# Patient Record
Sex: FEMALE | Race: WHITE | Employment: OTHER | URBAN - METROPOLITAN AREA
[De-identification: names, ages, dates, MRNs, and addresses within clinical notes are randomized per-mention and may not be internally consistent; named-entity substitution may affect disease eponyms.]

---

## 2017-02-07 ENCOUNTER — HOSPITAL ENCOUNTER (EMERGENCY)
Age: 37
Discharge: HOME | End: 2017-02-08
Attending: EMERGENCY MEDICINE
Payer: MEDICARE

## 2017-02-07 DIAGNOSIS — G89.29 CHRONIC MIDLINE LOW BACK PAIN WITHOUT SCIATICA: ICD-10-CM

## 2017-02-07 DIAGNOSIS — R33.9 URINARY RETENTION: Primary | ICD-10-CM

## 2017-02-07 DIAGNOSIS — M54.50 CHRONIC MIDLINE LOW BACK PAIN WITHOUT SCIATICA: ICD-10-CM

## 2017-02-07 LAB
ALBUMIN SERPL BCP-MCNC: 3.4 G/DL (ref 3.5–5)
ALBUMIN/GLOB SERPL: 0.8 {RATIO} (ref 1.2–3.5)
ALP SERPL-CCNC: 65 U/L (ref 50–136)
ALT SERPL-CCNC: 21 U/L (ref 12–65)
ANION GAP BLD CALC-SCNC: 9 MMOL/L (ref 7–16)
AST SERPL W P-5'-P-CCNC: 17 U/L (ref 15–37)
BASOPHILS # BLD AUTO: 0 K/UL (ref 0–0.2)
BASOPHILS # BLD: 0 % (ref 0–2)
BILIRUB SERPL-MCNC: 0.3 MG/DL (ref 0.2–1.1)
BUN SERPL-MCNC: 8 MG/DL (ref 6–23)
CALCIUM SERPL-MCNC: 9.1 MG/DL (ref 8.3–10.4)
CHLORIDE SERPL-SCNC: 107 MMOL/L (ref 98–107)
CO2 SERPL-SCNC: 24 MMOL/L (ref 21–32)
CREAT SERPL-MCNC: 0.66 MG/DL (ref 0.6–1)
DIFFERENTIAL METHOD BLD: ABNORMAL
EOSINOPHIL # BLD: 0.2 K/UL (ref 0–0.8)
EOSINOPHIL NFR BLD: 2 % (ref 0.5–7.8)
ERYTHROCYTE [DISTWIDTH] IN BLOOD BY AUTOMATED COUNT: 12.6 % (ref 11.9–14.6)
GLOBULIN SER CALC-MCNC: 4.3 G/DL (ref 2.3–3.5)
GLUCOSE SERPL-MCNC: 109 MG/DL (ref 65–100)
HCT VFR BLD AUTO: 42.6 % (ref 35.8–46.3)
HGB BLD-MCNC: 14.5 G/DL (ref 11.7–15.4)
IMM GRANULOCYTES # BLD: 0 K/UL (ref 0–0.5)
IMM GRANULOCYTES NFR BLD AUTO: 0.2 % (ref 0–5)
LYMPHOCYTES # BLD AUTO: 18 % (ref 13–44)
LYMPHOCYTES # BLD: 1.5 K/UL (ref 0.5–4.6)
MCH RBC QN AUTO: 30.9 PG (ref 26.1–32.9)
MCHC RBC AUTO-ENTMCNC: 34 G/DL (ref 31.4–35)
MCV RBC AUTO: 90.6 FL (ref 79.6–97.8)
MONOCYTES # BLD: 0.9 K/UL (ref 0.1–1.3)
MONOCYTES NFR BLD AUTO: 10 % (ref 4–12)
NEUTS SEG # BLD: 6.1 K/UL (ref 1.7–8.2)
NEUTS SEG NFR BLD AUTO: 70 % (ref 43–78)
PLATELET # BLD AUTO: 196 K/UL (ref 150–450)
PMV BLD AUTO: 9.2 FL (ref 10.8–14.1)
POTASSIUM SERPL-SCNC: 4.1 MMOL/L (ref 3.5–5.1)
PROT SERPL-MCNC: 7.7 G/DL (ref 6.3–8.2)
RBC # BLD AUTO: 4.7 M/UL (ref 4.05–5.25)
SODIUM SERPL-SCNC: 140 MMOL/L (ref 136–145)
WBC # BLD AUTO: 8.7 K/UL (ref 4.3–11.1)

## 2017-02-07 PROCEDURE — 85025 COMPLETE CBC W/AUTO DIFF WBC: CPT | Performed by: EMERGENCY MEDICINE

## 2017-02-07 PROCEDURE — 74011250636 HC RX REV CODE- 250/636: Performed by: EMERGENCY MEDICINE

## 2017-02-07 PROCEDURE — 80053 COMPREHEN METABOLIC PANEL: CPT | Performed by: EMERGENCY MEDICINE

## 2017-02-07 PROCEDURE — 86140 C-REACTIVE PROTEIN: CPT | Performed by: EMERGENCY MEDICINE

## 2017-02-07 PROCEDURE — 99284 EMERGENCY DEPT VISIT MOD MDM: CPT | Performed by: EMERGENCY MEDICINE

## 2017-02-07 PROCEDURE — 81003 URINALYSIS AUTO W/O SCOPE: CPT | Performed by: EMERGENCY MEDICINE

## 2017-02-07 PROCEDURE — 85652 RBC SED RATE AUTOMATED: CPT | Performed by: EMERGENCY MEDICINE

## 2017-02-07 RX ADMIN — SODIUM CHLORIDE 1000 ML: 900 INJECTION, SOLUTION INTRAVENOUS at 22:52

## 2017-02-08 VITALS
RESPIRATION RATE: 16 BRPM | HEART RATE: 84 BPM | BODY MASS INDEX: 40.32 KG/M2 | HEIGHT: 65 IN | OXYGEN SATURATION: 98 % | TEMPERATURE: 98.3 F | DIASTOLIC BLOOD PRESSURE: 65 MMHG | SYSTOLIC BLOOD PRESSURE: 119 MMHG | WEIGHT: 242 LBS

## 2017-02-08 LAB
CRP SERPL-MCNC: 1.6 MG/DL (ref 0–0.9)
ERYTHROCYTE [SEDIMENTATION RATE] IN BLOOD: 29 MM/HR (ref 0–20)
FLUAV AG NPH QL IA: NEGATIVE
FLUBV AG NPH QL IA: NEGATIVE

## 2017-02-08 PROCEDURE — 87804 INFLUENZA ASSAY W/OPTIC: CPT | Performed by: EMERGENCY MEDICINE

## 2017-02-08 RX ORDER — TRAMADOL HYDROCHLORIDE 50 MG/1
50 TABLET ORAL
Qty: 15 TAB | Refills: 0 | Status: SHIPPED | OUTPATIENT
Start: 2017-02-08 | End: 2018-10-02

## 2017-02-08 RX ORDER — ALBUTEROL SULFATE 0.83 MG/ML
SOLUTION RESPIRATORY (INHALATION)
Status: DISCONTINUED
Start: 2017-02-08 | End: 2017-02-08

## 2017-02-08 NOTE — DISCHARGE INSTRUCTIONS
Back Pain: Care Instructions  Your Care Instructions    Back pain has many possible causes. It is often related to problems with muscles and ligaments of the back. It may also be related to problems with the nerves, discs, or bones of the back. Moving, lifting, standing, sitting, or sleeping in an awkward way can strain the back. Sometimes you don't notice the injury until later. Arthritis is another common cause of back pain. Although it may hurt a lot, back pain usually improves on its own within several weeks. Most people recover in 12 weeks or less. Using good home treatment and being careful not to stress your back can help you feel better sooner. Follow-up care is a key part of your treatment and safety. Be sure to make and go to all appointments, and call your doctor if you are having problems. Its also a good idea to know your test results and keep a list of the medicines you take. How can you care for yourself at home? · Sit or lie in positions that are most comfortable and reduce your pain. Try one of these positions when you lie down:  ¨ Lie on your back with your knees bent and supported by large pillows. ¨ Lie on the floor with your legs on the seat of a sofa or chair. Holly Pacheco on your side with your knees and hips bent and a pillow between your legs. ¨ Lie on your stomach if it does not make pain worse. · Do not sit up in bed, and avoid soft couches and twisted positions. Bed rest can help relieve pain at first, but it delays healing. Avoid bed rest after the first day of back pain. · Change positions every 30 minutes. If you must sit for long periods of time, take breaks from sitting. Get up and walk around, or lie in a comfortable position. · Try using a heating pad on a low or medium setting for 15 to 20 minutes every 2 or 3 hours. Try a warm shower in place of one session with the heating pad. · You can also try an ice pack for 10 to 15 minutes every 2 to 3 hours.  Put a thin cloth between the ice pack and your skin. · Take pain medicines exactly as directed. ¨ If the doctor gave you a prescription medicine for pain, take it as prescribed. ¨ If you are not taking a prescription pain medicine, ask your doctor if you can take an over-the-counter medicine. · Take short walks several times a day. You can start with 5 to 10 minutes, 3 or 4 times a day, and work up to longer walks. Walk on level surfaces and avoid hills and stairs until your back is better. · Return to work and other activities as soon as you can. Continued rest without activity is usually not good for your back. · To prevent future back pain, do exercises to stretch and strengthen your back and stomach. Learn how to use good posture, safe lifting techniques, and proper body mechanics. When should you call for help? Call your doctor now or seek immediate medical care if:  · You have new or worsening numbness in your legs. · You have new or worsening weakness in your legs. (This could make it hard to stand up.)  · You lose control of your bladder or bowels. Watch closely for changes in your health, and be sure to contact your doctor if:  · Your pain gets worse. · You are not getting better after 2 weeks. Where can you learn more? Go to http://agueda-abiel.info/. Enter J883 in the search box to learn more about \"Back Pain: Care Instructions. \"  Current as of: May 23, 2016  Content Version: 11.1  © 2047-4497 [employer]. Care instructions adapted under license by [employer] (which disclaims liability or warranty for this information). If you have questions about a medical condition or this instruction, always ask your healthcare professional. [address] any warranty or liability for your use of this information.

## 2017-02-08 NOTE — ED PROVIDER NOTES
HPI Comments: Patient is a 40-year-old smoker who reports many years ago she had a back injury. She states she believes she slipped disc. This has been ongoing problem for quite some time but has gotten somewhat worse over the past 3 weeks. Over the past several days she noted a low-grade fever at home. Patient also had the sensation that she is unable to fully empty her bladder. She is walking with a walker. She has been doing this for several months because of her back pain. No recent vomiting, shortness of breath, cough. Patient is able to squeeze her cheeks together. She has normal sensation in her lower extremities. The history is provided by the patient. No  was used.         Past Medical History:   Diagnosis Date    Acute sinusitis, unspecified 5/6/2014    ADHD (attention deficit hyperactivity disorder) evaluation 8/26/2016    Allergic rhinitis, cause unspecified 5/6/2014    Arrhythmia      tachycardia per pt from seizure medicine    Bipolar disorder, unspecified ([address] 5/6/2014    Diabetes ([address]     Dysuria 5/6/2014    Hidradenitis 5/6/2014    Hirsutism 5/6/2014    Hypotension, unspecified 5/6/2014    Inflammatory disease of breast 5/6/2014    Memory difficulties 8/26/2016    Obesity, unspecified 5/6/2014    Psychiatric disorder     Seizures ([address]     Spell of altered consciousness 8/26/2016    Tobacco use disorder 5/6/2014    Unspecified asthma(493.90) 5/6/2014    Unspecified gingival and periodontal disease 5/6/2014    Unspecified sleep apnea 5/6/2014    Urinary tract infection, site not specified 5/6/2014       Past Surgical History:   Procedure Laterality Date    Pr abdomen surgery proc unlisted      Hx cholecystectomy      Hx refractive surgery      Hx gyn       Tubal Ligation    Hx orthopaedic       Lumbar Spine Surgery    Hx other surgical       2 eye surgeries as a child for lazy eye         Family History:   Problem Relation Age of Onset    Diabetes Mother     Heart Disease Mother     COPD Father     Depression Father     Diabetes Sister     Heart Disease Sister     Cancer Maternal Grandfather     Heart Disease Maternal Grandfather     Heart Disease Paternal Grandfather        Social History     Social History    Marital status:      Spouse name: N/A    Number of children: N/A    Years of education: N/A     Occupational History    Not on file. Social History Main Topics    Smoking status: Current Every Day Smoker     Packs/day: 0.50     Years: 14.00    Smokeless tobacco: Current User      Comment: E-cig    Alcohol use No    Drug use: No    Sexual activity: Not on file     Other Topics Concern    Not on file     Social History Narrative         ALLERGIES: Sulfa (sulfonamide antibiotics); Sulfamethoxazole-trimethoprim; Codeine; Medrol [methylprednisolone]; and Morphine    Review of Systems    Vitals:    02/07/17 2212   BP: 138/58   Pulse: 95   Resp: 18   Temp: 99.5 °F (37.5 °C)   SpO2: 98%   Weight: 109.8 kg (242 lb)   Height: 5' 5\" (1.651 m)            Physical Exam   Constitutional: She is oriented to person, place, and time. She appears well-developed and well-nourished. No distress. HENT:   Head: Normocephalic and atraumatic. Eyes: Conjunctivae and EOM are normal. Pupils are equal, round, and reactive to light. Neck: Normal range of motion. Neck supple. Cardiovascular: Normal rate, regular rhythm and normal heart sounds. Pulmonary/Chest: Effort normal and breath sounds normal. No respiratory distress. She has no wheezes. She has no rales. Abdominal: Soft. She exhibits no distension. There is no tenderness. There is no rebound. Genitourinary:   Genitourinary Comments: Normal rectal exam with good tone    No CVA tenderness bilaterally   Musculoskeletal: Normal range of motion. She exhibits no edema, tenderness or deformity. Neurological: She is alert and oriented to person, place, and time.    Skin: Skin is warm and dry. No rash noted. She is not diaphoretic. Psychiatric: She has a normal mood and affect. Her behavior is normal.        MDM  Number of Diagnoses or Management Options  Chronic midline low back pain without sciatica: new and does not require workup  Urinary retention: new and does not require workup  Diagnosis management comments: Patient is walking throughout the emergency department without significant difficulty. She urinated without need for a straight catheter. She has had no fevers here in the emergency department. Urine negative. Labs are overall reassuring. Low suspicion for possible epidural abscess or cauda equina syndrome. Discussed follow up with PCP for outpatient MRI if pain persists. Patient expressed understanding and reports that she will call her PCP tomorrow. Discharged home in stable condition. Return precautions discussed.        Amount and/or Complexity of Data Reviewed  Clinical lab tests: ordered and reviewed (Results for orders placed or performed during the hospital encounter of 02/07/17  -INFLUENZA A & B AG (RAPID TEST)       Result                                            Value                         Ref Range                       Influenza A Ag                                    NEGATIVE                      NEG                             Influenza B Ag                                    NEGATIVE                      NEG                        -CBC WITH AUTOMATED DIFF       Result                                            Value                         Ref Range                       WBC                                               8.7                           4.3 - 11.1 K/uL                 RBC                                               4.70                          4.05 - 5.25 M/uL                HGB                                               14.5                          11.7 - 15.4 g/dL                HCT 42.6                          35.8 - 46.3 %                   MCV                                               90.6                          79.6 - 97.8 FL                  MCH                                               30.9                          26.1 - 32.9 PG                  MCHC                                              34.0                          31.4 - 35.0 g/dL                RDW                                               12.6                          11.9 - 14.6 %                   PLATELET                                          196                           150 - 450 K/uL                  MPV                                               9.2 (L)                       10.8 - 14.1 FL                  DF                                                AUTOMATED                                                     NEUTROPHILS                                       70                            43 - 78 %                       LYMPHOCYTES                                       18                            13 - 44 %                       MONOCYTES                                         10                            4.0 - 12.0 %                    EOSINOPHILS                                       2                             0.5 - 7.8 %                     BASOPHILS                                         0                             0.0 - 2.0 %                     IMMATURE GRANULOCYTES                             0.2                           0.0 - 5.0 %                     ABS. NEUTROPHILS                                  6.1                           1.7 - 8.2 K/UL                  ABS. LYMPHOCYTES                                  1.5                           0.5 - 4.6 K/UL                  ABS. MONOCYTES                                    0.9                           0.1 - 1.3 K/UL                  ABS.  EOSINOPHILS                                  0.2                           0.0 - 0.8 K/UL ABS. BASOPHILS                                    0.0                           0.0 - 0.2 K/UL                  ABS. IMM.  GRANS.                                  0.0                           0.0 - 0.5 K/UL             -METABOLIC PANEL, COMPREHENSIVE       Result                                            Value                         Ref Range                       Sodium                                            140                           136 - 145 mmol/L                Potassium                                         4.1                           3.5 - 5.1 mmol/L                Chloride                                          107                           98 - 107 mmol/L                 CO2                                               24                            21 - 32 mmol/L                  Anion gap                                         9                             7 - 16 mmol/L                   Glucose                                           109 (H)                       65 - 100 mg/dL                  BUN                                               8                             6 - 23 MG/DL                    Creatinine                                        0.66                          0.6 - 1.0 MG/DL                 GFR est AA                                        >60                           >60 ml/min/1.73m2               GFR est non-AA                                    >60                           >60 ml/min/1.73m2               Calcium                                           9.1                           8.3 - 10.4 MG/DL                Bilirubin, total                                  0.3                           0.2 - 1.1 MG/DL                 ALT (SGPT)                                        21                            12 - 65 U/L                     AST (SGOT)                                        17                            15 - 37 U/L Alk. phosphatase                                  65                            50 - 136 U/L                    Protein, total                                    7.7                           6.3 - 8.2 g/dL                  Albumin                                           3.4 (L)                       3.5 - 5.0 g/dL                  Globulin                                          4.3 (H)                       2.3 - 3.5 g/dL                  A-G Ratio                                         0.8 (L)                       1.2 - 3.5                  -SED RATE, AUTOMATED       Result                                            Value                         Ref Range                       Sed rate, automated                               29 (H)                        0 - 20 mm/hr               -C REACTIVE PROTEIN, QT       Result                                            Value                         Ref Range                       C-Reactive protein                                1.6 (H)                       0.0 - 0.9 mg/dL            )  Review and summarize past medical records: yes  Independent visualization of images, tracings, or specimens: yes    Risk of Complications, Morbidity, and/or Mortality  Presenting problems: high  Diagnostic procedures: moderate  Management options: moderate    Patient Progress  Patient progress: improved    ED Course       Procedures

## 2017-02-08 NOTE — ED TRIAGE NOTES
Pt arrives with complaints of lower back pain x 3 weeks, difficulty urinating and fever. States took 800mg Advil at [facility].

## 2017-02-24 ENCOUNTER — HOSPITAL ENCOUNTER (EMERGENCY)
Facility: HOSPITAL | Age: 37
Discharge: HOME | End: 2017-02-24
Admitting: EMERGENCY MEDICINE
Payer: COMMERCIAL

## 2017-02-24 VITALS
SYSTOLIC BLOOD PRESSURE: 113 MMHG | HEART RATE: 82 BPM | WEIGHT: 236.8 LBS | OXYGEN SATURATION: 97 % | RESPIRATION RATE: 16 BRPM | DIASTOLIC BLOOD PRESSURE: 56 MMHG | TEMPERATURE: 97.4 F | BODY MASS INDEX: 39.45 KG/M2 | HEIGHT: 65 IN

## 2017-02-24 DIAGNOSIS — R35.89 POLYURIA: Primary | ICD-10-CM

## 2017-02-24 DIAGNOSIS — F32.A DEPRESSION: ICD-10-CM

## 2017-02-24 DIAGNOSIS — R63.1 POLYDIPSIA: ICD-10-CM

## 2017-02-24 LAB
ANION GAP SERPL CALCULATED.3IONS-SCNC: 8 MMOL/L (ref 4–13)
BACTERIA UR QL AUTO: ABNORMAL /HPF
BASOPHILS # BLD AUTO: 0.07 THOUSANDS/ΜL (ref 0–0.1)
BASOPHILS NFR BLD AUTO: 1 % (ref 0–1)
BILIRUB UR QL STRIP: NEGATIVE
BUN SERPL-MCNC: 5 MG/DL (ref 5–25)
CALCIUM SERPL-MCNC: 9.1 MG/DL (ref 8.3–10.1)
CALCIUM SERPL-MCNC: 9.2 MG/DL (ref 8.3–10.1)
CHLORIDE SERPL-SCNC: 105 MMOL/L (ref 100–108)
CLARITY UR: CLEAR
CO2 SERPL-SCNC: 27 MMOL/L (ref 21–32)
COLOR UR: ABNORMAL
CREAT SERPL-MCNC: 0.47 MG/DL (ref 0.6–1.3)
EOSINOPHIL # BLD AUTO: 0.24 THOUSAND/ΜL (ref 0–0.61)
EOSINOPHIL NFR BLD AUTO: 2 % (ref 0–6)
ERYTHROCYTE [DISTWIDTH] IN BLOOD BY AUTOMATED COUNT: 12 % (ref 11.6–15.1)
GFR SERPL CREATININE-BSD FRML MDRD: >60 ML/MIN/1.73SQ M
GLUCOSE SERPL-MCNC: 111 MG/DL (ref 65–140)
GLUCOSE SERPL-MCNC: 94 MG/DL (ref 65–140)
GLUCOSE UR STRIP-MCNC: NEGATIVE MG/DL
HCT VFR BLD AUTO: 44.1 % (ref 34.8–46.1)
HGB BLD-MCNC: 14.8 G/DL (ref 11.5–15.4)
HGB UR QL STRIP.AUTO: ABNORMAL
KETONES UR STRIP-MCNC: NEGATIVE MG/DL
LEUKOCYTE ESTERASE UR QL STRIP: NEGATIVE
LYMPHOCYTES # BLD AUTO: 2.66 THOUSANDS/ΜL (ref 0.6–4.47)
LYMPHOCYTES NFR BLD AUTO: 25 % (ref 14–44)
MCH RBC QN AUTO: 30.4 PG (ref 26.8–34.3)
MCHC RBC AUTO-ENTMCNC: 33.6 G/DL (ref 31.4–37.4)
MCV RBC AUTO: 91 FL (ref 82–98)
MONOCYTES # BLD AUTO: 0.51 THOUSAND/ΜL (ref 0.17–1.22)
MONOCYTES NFR BLD AUTO: 5 % (ref 4–12)
NEUTROPHILS # BLD AUTO: 7.03 THOUSANDS/ΜL (ref 1.85–7.62)
NEUTS SEG NFR BLD AUTO: 67 % (ref 43–75)
NITRITE UR QL STRIP: NEGATIVE
NON-SQ EPI CELLS URNS QL MICRO: ABNORMAL /HPF
NRBC BLD AUTO-RTO: 0 /100 WBCS
OSMOLALITY UR/SERPL-RTO: 290 MMOL/KG (ref 282–298)
OSMOLALITY UR: 108 MMOL/KG
PH UR STRIP.AUTO: 6 [PH] (ref 4.5–8)
PLATELET # BLD AUTO: 214 THOUSANDS/UL (ref 149–390)
PMV BLD AUTO: 9.1 FL (ref 8.9–12.7)
POTASSIUM SERPL-SCNC: 3.9 MMOL/L (ref 3.5–5.3)
PROT UR STRIP-MCNC: NEGATIVE MG/DL
RBC # BLD AUTO: 4.87 MILLION/UL (ref 3.81–5.12)
RBC #/AREA URNS AUTO: ABNORMAL /HPF
SODIUM SERPL-SCNC: 140 MMOL/L (ref 136–145)
SP GR UR STRIP.AUTO: <=1.005 (ref 1–1.03)
UROBILINOGEN UR QL STRIP.AUTO: 0.2 E.U./DL
WBC # BLD AUTO: 10.54 THOUSAND/UL (ref 4.31–10.16)
WBC #/AREA URNS AUTO: ABNORMAL /HPF

## 2017-02-24 PROCEDURE — 82310 ASSAY OF CALCIUM: CPT | Performed by: PHYSICIAN ASSISTANT

## 2017-02-24 PROCEDURE — 83935 ASSAY OF URINE OSMOLALITY: CPT | Performed by: PHYSICIAN ASSISTANT

## 2017-02-24 PROCEDURE — 80048 BASIC METABOLIC PNL TOTAL CA: CPT | Performed by: PHYSICIAN ASSISTANT

## 2017-02-24 PROCEDURE — 84588 ASSAY OF VASOPRESSIN: CPT | Performed by: PHYSICIAN ASSISTANT

## 2017-02-24 PROCEDURE — 82948 REAGENT STRIP/BLOOD GLUCOSE: CPT

## 2017-02-24 PROCEDURE — 83930 ASSAY OF BLOOD OSMOLALITY: CPT | Performed by: PHYSICIAN ASSISTANT

## 2017-02-24 PROCEDURE — 87086 URINE CULTURE/COLONY COUNT: CPT | Performed by: PHYSICIAN ASSISTANT

## 2017-02-24 PROCEDURE — 36415 COLL VENOUS BLD VENIPUNCTURE: CPT | Performed by: PHYSICIAN ASSISTANT

## 2017-02-24 PROCEDURE — 81001 URINALYSIS AUTO W/SCOPE: CPT | Performed by: PHYSICIAN ASSISTANT

## 2017-02-24 PROCEDURE — 85025 COMPLETE CBC W/AUTO DIFF WBC: CPT | Performed by: PHYSICIAN ASSISTANT

## 2017-02-24 PROCEDURE — 99284 EMERGENCY DEPT VISIT MOD MDM: CPT

## 2017-02-24 PROCEDURE — 87147 CULTURE TYPE IMMUNOLOGIC: CPT | Performed by: PHYSICIAN ASSISTANT

## 2017-02-24 RX ORDER — ONDANSETRON 2 MG/ML
4 INJECTION INTRAMUSCULAR; INTRAVENOUS ONCE
Status: DISCONTINUED | OUTPATIENT
Start: 2017-02-24 | End: 2017-02-24 | Stop reason: HOSPADM

## 2017-02-26 LAB
BACTERIA UR CULT: NORMAL
BACTERIA UR CULT: NORMAL

## 2017-03-02 LAB
OSMOLALITY SERPL: 282 MOSMOL/KG (ref 275–295)
VASOPRESSIN SERPL-MCNC: 2 PG/ML (ref 0–4.7)

## 2017-03-06 ENCOUNTER — ALLSCRIPTS OFFICE VISIT (OUTPATIENT)
Dept: OTHER | Facility: OTHER | Age: 37
End: 2017-03-06

## 2017-03-06 DIAGNOSIS — R35.89 OTHER POLYURIA: ICD-10-CM

## 2017-03-06 DIAGNOSIS — R53.83 OTHER FATIGUE: ICD-10-CM

## 2017-03-06 DIAGNOSIS — Z00.00 ENCOUNTER FOR GENERAL ADULT MEDICAL EXAMINATION WITHOUT ABNORMAL FINDINGS: ICD-10-CM

## 2017-03-06 DIAGNOSIS — E11.9 TYPE 2 DIABETES MELLITUS WITHOUT COMPLICATIONS (HCC): ICD-10-CM

## 2017-03-29 ENCOUNTER — GENERIC CONVERSION - ENCOUNTER (OUTPATIENT)
Dept: OTHER | Facility: OTHER | Age: 37
End: 2017-03-29

## 2017-04-24 ENCOUNTER — ALLSCRIPTS OFFICE VISIT (OUTPATIENT)
Dept: OTHER | Facility: OTHER | Age: 37
End: 2017-04-24

## 2017-04-25 PROBLEM — R40.1: Status: ACTIVE | Noted: 2017-04-25

## 2018-01-10 NOTE — MISCELLANEOUS
Provider Comments  Provider Comments:   PATIENT NO SHOWED FOR 4- APPT  Signatures   Electronically signed by : Jessica Gottlieb MA;  Apr 24 2017 12:16PM EST                       (Author)

## 2018-01-10 NOTE — MISCELLANEOUS
Provider Comments  Provider Comments:   Patient did not show for the appointment      Signatures   Electronically signed by : Han Mason MD; Mar 29 2017  1:19PM EST                       (Author)

## 2018-01-14 VITALS
WEIGHT: 236.06 LBS | HEART RATE: 72 BPM | DIASTOLIC BLOOD PRESSURE: 68 MMHG | SYSTOLIC BLOOD PRESSURE: 108 MMHG | BODY MASS INDEX: 39.33 KG/M2 | HEIGHT: 65 IN

## 2018-10-02 PROBLEM — E66.01 OBESITY, MORBID (HCC): Status: ACTIVE | Noted: 2018-10-02

## 2018-10-02 PROBLEM — R73.01 IMPAIRED FASTING GLUCOSE: Status: ACTIVE | Noted: 2018-10-02

## 2018-10-02 PROBLEM — K21.9 GASTROESOPHAGEAL REFLUX DISEASE: Status: ACTIVE | Noted: 2018-10-02

## 2019-02-06 PROBLEM — E11.9 CONTROLLED TYPE 2 DIABETES MELLITUS WITHOUT COMPLICATION, WITHOUT LONG-TERM CURRENT USE OF INSULIN (HCC): Status: ACTIVE | Noted: 2019-02-06

## 2019-10-15 PROBLEM — L02.211 ABSCESS OF FLANK: Status: ACTIVE | Noted: 2019-10-15

## 2020-12-02 ENCOUNTER — HOSPITAL ENCOUNTER (OUTPATIENT)
Dept: PHYSICAL THERAPY | Age: 40
Discharge: HOME | End: 2020-12-02
Attending: FAMILY MEDICINE
Payer: MEDICARE

## 2020-12-02 DIAGNOSIS — R53.1 RIGHT SIDED WEAKNESS: ICD-10-CM

## 2020-12-02 DIAGNOSIS — I63.9 CEREBROVASCULAR ACCIDENT (CVA), UNSPECIFIED MECHANISM (HCC): ICD-10-CM

## 2020-12-02 PROCEDURE — 97163 PT EVAL HIGH COMPLEX 45 MIN: CPT

## 2020-12-02 NOTE — THERAPY EVALUATION
Starr Dwyer  : 1980  Primary: Jennifer Chandler Medicare Hmo  Secondary:  [address]  [facility] [address] [facility] [place] [facility]\"", [place] [address] [address] [facility]  [phone]   [phone]       OUTPATIENT PHYSICAL THERAPY:Initial Assessment and Discontinuation Summary 2020   ICD-10: Treatment Diagnosis: Difficulty in walking, not elsewhere classified (R26.2)  Precautions/Allergies:   Sulfa (sulfonamide antibiotics); Sulfamethoxazole-trimethoprim; Codeine; Medrol [methylprednisolone]; and Morphine   MD Orders: PT referral  MEDICAL/REFERRING DIAGNOSIS:  Cerebrovascular accident (CVA), unspecified mechanism ([facility] [address] [I63.9]  Right sided weakness [R53.1] DATE OF ONSET: 2020  REFERRING PHYSICIAN: Smita Esparza MD  RETURN PHYSICIAN APPOINTMENT: Unknown  DATE OF PROGRESS NOTE:  7989  RECERTIFICATION DATE:    Starr Dwyer has been seen in physical therapy from 2020 to 2020 for 1 visits. Treatment has been discontinued at this time due to patient failing to return for additional treatment. Thank you for this referral.       INITIAL ASSESSMENT:  Ms. Tiffanie Hawkins presents today in our clinic wheelchair with her sister. Pt states she can answer for herself so her sister was not invited back. Pt had  great difficulty providing facts of her current condition, prior history and medications. Her time line if off or fabricated. Pt presents here after visit to the ER for stroke like symptoms on 2020 for which she then left AMA ambulatory on her own. Hector Cool RN - 2020 2:53 PM EST  Patient left AMA at this time. This RN explained to patients the benefits of staying in hospital and receiving care. Patient didn't think it was necessary to stay in hospital. This RN explained the risk of leaving to the patient, including the risk of permanent disability and/or death. Patient signed AMA paperwork and was ambulatory to ER lobby.    Electronically signed by Jailyn Perez RN at 11/11/2020 2:55 PM ES. As a result pt has had no follow up of any kind of therapy or equipment. Pt states she has been falling and does not want to be a burden to anyone. Pt presents with conflicting facts and dates as well as fluctuating presentation on the right that is not consistent with a hemiparesis or stroke paralysis. Pt is requesting a w/c or a walker. Pt can barely stand even through her strong side. Pt stood fully flexed and collapsed to the right while gripping to the barre. Pt though was able to walk down her ramp to get to her sisters car to get here. Pt cannot raise her right foot on command but has no foot drop or drag during gait. Pt cannot perform hip flexion on request on the right but can independently lift her leg onto the bed for bed mobility. Pt sits with right elbow flexed to 90* and her hand flexed and clawed in sitting and moves only slowly on command but was able to fully put her hand down to the mat with transfer and have her right hand try to assist the right leg with hip flexion. .  Pt states she can only touch her nose with that right hand but later was able to brush her hair easily away  from her forehead. Pt states later that she does not use the arm because it hurts and burns. Pt \"falls\" onto the mat and into the car with transfers but no full collapse of her legs. Pt presents with signs and symptoms of Functional neurologic - conversion disorder. Currently pt is not able to utilize her right side functionally and is not able to care for her self at this time and will benefit from physical therapy to provide training and support to regain full use of her right side. PROBLEM LIST (Impacting functional limitations):  1. Decreased Strength  2. Decreased ADL/Functional Activities  3. Decreased Transfer Abilities  4. Decreased Ambulation Ability/Technique  5. Decreased Balance  6. Increased Pain  7.  Decreased [place] with Home Exercise Program  8. Decreased Cognition INTERVENTIONS PLANNED: (Treatment may consist of any combination of the following)  9. Balance Exercise  10. Family Education  11. Gait Training  12. Home Exercise Program (HEP)  13. Manual Therapy  14. Neuromuscular Re-education/Strengthening  15. Range of Motion (ROM)  16. Therapeutic Activites  17. Therapeutic Exercise/Strengthening  18. Transfer Training   TREATMENT PLAN:  Effective Dates: 12/2/2020 TO 3/3/2021 (90 days). Frequency/Duration: 2 times a week for 90 Days   GOALS: (Goals have been discussed and agreed upon with patient.)  Discharge Goals: Time Frame: 16 visits  1. Pt will show increased strength, range of motion and improved posture to allow for improved safety and ability with all functional mobility. 2. Pt will decrease TUG score by TBA seconds indicating more normalized gait pattern and decrease risk for falls. 3. Pt will increase Castro Balance Scale to 50/56 indicating increased balance and decreased risk for falls. OUTCOME MEASURE:   Tool Used: Castro Balance Scale  Score:  Initial: 10/56 Most Recent: X/56 (Date: -- )   Interpretation of Score: Each section is scored on a 0-4 scale, 0 representing the patients inability to perform the task and 4 representing independence. The scores of each section are added together for a total score of 56. The higher the patients score, the more independent the patient is. Any score below 45 indicates increased risk for falls. Tool Used: Timed Up and Go (TUG)  Score:  Initial: TBA seconds Most Recent: X seconds (Date: -- )   Interpretation of Score: The test measures, in seconds, the time taken by an individual to stand up from a standard arm chair (seat height 46 cm [18 in], arm height 65 cm [25.6 in]), walk a distance of 3 meters (118 in, approx 10 ft), turn, walk back to the chair and sit down.   If the individual takes longer than 14 seconds to complete TUG, this indicates risk for falls.    MEDICAL NECESSITY:   · Skilled intervention continues to be required due to inability to use right side functionally. REASON FOR SERVICES/OTHER COMMENTS:  · Patient continues to require skilled intervention due to difficulty with all mobility. .  Total Duration:  PT Patient Time In/Time Out  Time In: 1100  Time Out: 1200    Rehabilitation Potential For Stated Goals: [address] [facility] therapy, I certify that the treatment plan above will be carried out by a therapist or under their direction. Thank you for this referral,  Milena John PT     Referring Physician Signature: Amanda Kennedy MD _______________________________ Date _____________     PAIN/SUBJECTIVE:   Initial: 5/10 Post Session:  5/10   HISTORY:   Patient's Goal: To be able to walk again hopefully by myself. Use my hands as much as possile and be back as much as possible. History of Injury/Illness (Reason for Referral):  36year old left handed female s/p CVA on 11/11/2020 with right sided weakness. CT scan negative. Left ER AMA walking. \"We have a ramp to get in the house because my mom is in a wheel chair. So that is how I pull myself to the car because I can use the rail and quite honestly I crawl some of the time and then she helps me up. Its after I have fallen. Some days I fall 3 - 4 times a day. Otherwise I just stay in bed all day that way I don't fall. Its a strain for someone to always be having to pick someone my size up from the floor\"  Pt sits with right elbow flexed to 90* and her hand flexed and clawed in sitting and moves only slowly on command but was able to fully put her hand down to the mat with transfer and have her right hand try to assist the right leg with hip flexion. Pt states she can only touch her nose with that right hand but later was able to brush her hair easily away  from her forehead. Pt states later that she does not use the arm because it hurts and burns.   Pt \"falls\" onto the mat and into the car with transfers but no full collapse of her legs. Pt has orange stain on her left middle finger - pt states she is down to 1/2 pack a day stretching them out to one every 4 - 5 hours. My arm and my legs on that side burn and feel like it has pressure on it and its hurts it. 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 This time frame is not consistent with her medical chart. She states that physicians in [facility] gave her anxiety meds and that is why they are not showing up in this record. Past Medical History/Comorbidities:   Ms. Haseeb Lott  has a past medical history of Acute sinusitis, unspecified (5/6/2014), ADHD (attention deficit hyperactivity disorder) evaluation (8/26/2016), Allergic rhinitis, cause unspecified (5/6/2014), Arrhythmia, Asthma, Bipolar disorder, unspecified ([address] (5/6/2014), Diabetes ([address] Dysuria (5/6/2014), GERD (gastroesophageal reflux disease), Hidradenitis (5/6/2014), Hirsutism (5/6/2014), Hypotension, unspecified (5/6/2014), Inflammatory disease of breast (5/6/2014), Memory difficulties (8/26/2016), Obesity, unspecified (5/6/2014), Psychiatric disorder, PTSD (post-traumatic stress disorder), PUD (peptic ulcer disease), Seizures ([facility] [address] Spell of altered consciousness (8/26/2016), Stroke [facility]), Tobacco use disorder (5/6/2014), Unspecified asthma(493.90) (5/6/2014), Unspecified gingival and periodontal disease (5/6/2014), Unspecified sleep apnea (5/6/2014), and Urinary tract infection, site not specified (5/6/2014). Ms. Haseeb Lott  has a past surgical history that includes pr abdomen surgery proc unlisted; hx cholecystectomy; hx refractive surgery; hx gyn; hx orthopaedic; and hx other surgical.     Social History/Living Environment:     Lives with sister and mom.   \"We have a ramp to get in the house because my mom is in a wheel chair. So that is how I pull myself to the car because I can use the rail and quite honestly I crawl some of the time and then she helps me up. Its after I have fallen. Some days I fall 3 - 4 times a day. Otherwise I just stay in bed all day that way I don't fall. Its a strain for someone to always be having to pick someone my size up from the floor\". 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 Prior Level of Function/Work/Activity:  Independent prior to stroke. Cant bathe myself or anything. Tub shower combo - has not gotten into the shower since 11/11/2020  Sink bathing with bowl of hot water. Ambulatory/Rehab Services H2 Model Falls Risk Assessment   Risk Factors:       (4)  Confusion/Disorientation/Impulsivity       (5)  History of Recent Falls [w/in 3 months] Ability to Rise from Chair:       (3)  Multiple attempts, but successful   Falls Prevention Plan:       Physical Limitations to Exercise (specify):  barely walking. stays in bed   Total: (5 or greater = High Risk): 12   ©2010 [facility] . [facility] States Patent #[id number]. Federal Law prohibits the replication, distribution or use without written permission from [facility]   Current Medications:     Current Outpatient Medications:     acetaminophen (TylenoL) 325 mg tablet, Take  by mouth every four (4) hours as needed for Pain., Disp: , Rfl:     aspirin 81 mg chewable tablet, Take 81 mg by mouth daily. , Disp: , Rfl:     clotrimazole-betamethasone (LOTRISONE) topical cream, Apply to feet twice daily, Disp: 45 g, Rfl: 5    omeprazole (PRILOSEC) 40 mg capsule, Take 1 Cap by mouth daily. , Disp: 90 Cap, Rfl: 1    PARoxetine (PAXIL) 10 mg tablet, Take 1 Tab by mouth daily. , Disp: 90 Tab, Rfl: 1    PARoxetine CR (PAXIL-CR) 25 mg tablet, Take 1 Tab by mouth daily. , Disp: 90 Tab, Rfl: 1    calcium polycarbophil (FIBER LAXATIVE, CA POLYCARBO,) 625 mg tablet, Take 625 mg by mouth daily as needed. , Disp: , Rfl:     montelukast (SINGULAIR) 10 mg tablet, Take 1 Tab by mouth daily. , Disp: 90 Tab, Rfl: 1    lancets (ACCU-CHEK FASTCLIX LANCET DRUM) misc, Use daily to check blood sugar Dx:, Disp: 1 Each, Rfl: 11    glucose blood VI test strips (ACCU-CHEK GUIDE) strip, Use one strip daily to check blood sugar, Disp: 50 Strip, Rfl: 11    nystatin (MYCOSTATIN) 100,000 unit/gram ointment, Apply  to affected area two (2) times a day., Disp: 30 g, Rfl: 4    polyethylene glycol (MIRALAX) 17 gram packet, Take 17 g by mouth daily. , Disp: , Rfl:     albuterol (ACCUNEB) 1.25 mg/3 mL nebu, Take 3 mL by inhalation every six (6) hours as needed. , Disp: 300 mL, Rfl: 2    multivitamin (ONE A DAY) tablet, Take 1 Tab by mouth daily. , Disp: , Rfl:     miscellaneous medical supply misc, Diabetic shoes. Dx: diabetic peripheral neuropathy, Disp: 1 Each, Rfl: 0    miscellaneous medical supply misc, Glucometer, test strips and lancets. Test blood sugar twice a day. DX:250.02 Box of 100 strips and 100 Lancets, Disp: 1 Each, Rfl: 5    albuterol (PROVENTIL HFA, VENTOLIN HFA) 90 mcg/actuation inhaler, Take 2 Puffs by inhalation every six (6) hours as needed for Wheezing., Disp: 1 Inhaler, Rfl: 5   Date Last Reviewed:  12/2/2020   Number of Personal Factors/Comorbidities that affect the Plan of Care: 3+: HIGH COMPLEXITY   EXAMINATION:   ROM:  Right:  Elbow and hand full. Would not allow shoulder assessment 2* to complaint of burning and pain. Left appears WNL  LE:  Appear WNL  STRENGTH: does not move right side readily to command. Pt is able to fully move elbow and hand with transfer and hand to face, hip and knee and foot with bed mobility. Flexed standing but no foot drag with walking. TRANSFERS: raised up far from mat - bent in 1/2 grabbed for mat and spun around and fell onto the mat. GAIT:  Stands crouched and gripping bar with both arms.   Quick steps with some knee flexion on right, no foot drag. VISION:  nO complaints   Body Structures Involved:  1. Joints  2. Muscles  3. Ligaments Body Functions Affected:  1. Mental  2. Sensory/Pain  3. Neuromusculoskeletal  4. Movement Related Activities and Participation Affected:  1. Learning and Applying Knowledge  2. General Tasks and Demands  3. Communication  4. Mobility  5. Self Care  6. Domestic Life  7. Interpersonal Interactions and Relationships  8.  Community, Social and [place]   Number of elements (examined above) that affect the Plan of Care: 4+: HIGH COMPLEXITY   CLINICAL PRESENTATION:   Presentation: Evolving clinical presentation with unstable and unpredictable characteristics: HIGH COMPLEXITY   CLINICAL DECISION MAKING:   Use of outcome tool(s) and clinical judgement create a POC that gives a: Difficult prediction of patient's progress: HIGH COMPLEXITY

## 2021-01-26 PROBLEM — Z91.199 NO-SHOW FOR APPOINTMENT: Status: ACTIVE | Noted: 2021-01-26

## 2021-06-29 PROBLEM — R73.01 IMPAIRED FASTING GLUCOSE: Status: RESOLVED | Noted: 2018-10-02 | Resolved: 2021-06-29

## 2022-03-18 PROBLEM — Z91.199 NO-SHOW FOR APPOINTMENT: Status: ACTIVE | Noted: 2021-01-26

## 2022-03-18 PROBLEM — E66.01 OBESITY, MORBID (HCC): Status: ACTIVE | Noted: 2018-10-02

## 2022-03-18 PROBLEM — E11.9 CONTROLLED TYPE 2 DIABETES MELLITUS WITHOUT COMPLICATION, WITHOUT LONG-TERM CURRENT USE OF INSULIN (HCC): Status: ACTIVE | Noted: 2019-02-06

## 2022-03-19 PROBLEM — R40.1: Status: ACTIVE | Noted: 2017-04-25

## 2022-03-20 PROBLEM — K21.9 GASTROESOPHAGEAL REFLUX DISEASE: Status: ACTIVE | Noted: 2018-10-02

## 2022-03-20 PROBLEM — L02.211 ABSCESS OF FLANK: Status: ACTIVE | Noted: 2019-10-15

## 2024-05-21 ENCOUNTER — TELEPHONE (OUTPATIENT)
Dept: PRIMARY CARE CLINIC | Facility: CLINIC | Age: 44
End: 2024-05-21

## 2024-05-21 NOTE — TELEPHONE ENCOUNTER
Pt was recommended to follow up with her doctor in [place] and see if they can refer her to an infectious disease specialist.

## 2024-05-21 NOTE — TELEPHONE ENCOUNTER
[phone]  Pt requesting a call back - she has moved to [place] and has seen many doctors and they can't figure out what is going on with her. She has not been seen since 6 of 2021 - she would need to re-establish care. Offered next available new patient appt - she requests a virtual with Dr. HERNANDEZ - informed her that he can't do a virtual over state [place]. She would like a call back